# Patient Record
Sex: FEMALE | Race: OTHER | NOT HISPANIC OR LATINO | URBAN - METROPOLITAN AREA
[De-identification: names, ages, dates, MRNs, and addresses within clinical notes are randomized per-mention and may not be internally consistent; named-entity substitution may affect disease eponyms.]

---

## 2022-01-24 ENCOUNTER — EMERGENCY (EMERGENCY)
Facility: HOSPITAL | Age: 27
LOS: 1 days | Discharge: HOME | End: 2022-01-24
Admitting: EMERGENCY MEDICINE
Payer: OTHER MISCELLANEOUS

## 2022-01-24 VITALS
OXYGEN SATURATION: 99 % | RESPIRATION RATE: 17 BRPM | WEIGHT: 119.05 LBS | SYSTOLIC BLOOD PRESSURE: 117 MMHG | HEART RATE: 70 BPM | TEMPERATURE: 98 F | DIASTOLIC BLOOD PRESSURE: 59 MMHG
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
## 2022-01-24 DIAGNOSIS — S61.213A LACERATION WITHOUT FOREIGN BODY OF LEFT MIDDLE FINGER WITHOUT DAMAGE TO NAIL, INITIAL ENCOUNTER: ICD-10-CM
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
## 2022-01-24 PROCEDURE — 12001 RPR S/N/AX/GEN/TRNK 2.5CM/<: CPT

## 2022-01-24 PROCEDURE — 99053 MED SERV 10PM-8AM 24 HR FAC: CPT

## 2022-01-24 PROCEDURE — 99282 EMERGENCY DEPT VISIT SF MDM: CPT

## 2022-01-24 PROCEDURE — 99283 EMERGENCY DEPT VISIT LOW MDM: CPT | Mod: 25

## 2022-01-24 NOTE — ED PROVIDER NOTE - PATIENT PORTAL LINK FT
You can access the FollowMyHealth Patient Portal offered by [facility] by registering at the following website: [email or web address]. By joining [employer]’s FollowMyHealth portal, you will also be able to view your health information using other applications (apps) compatible with our system.

## 2022-01-24 NOTE — ED ADULT NURSE NOTE - NSIMPLEMENTINTERV_GEN_ALL_ED
Implemented All Universal Safety Interventions:  Britt to call system. Call bell, personal items and telephone within reach. Instruct patient to call for assistance. Room bathroom lighting operational. Non-slip footwear when patient is off stretcher. Physically safe environment: no spills, clutter or unnecessary equipment. Stretcher in lowest position, wheels locked, appropriate side rails in place.

## 2022-01-24 NOTE — ED PROVIDER NOTE - OBJECTIVE STATEMENT
25 y/o female with no PMHx who is UTD with Tdap is here in the ED c/o laceration to her left middle finger. Pt states she was at work when she accidently grazed a broken plate about an hour prior to ED arrival. She held pressure and found difficulty with controlling the bleeding therefore here in the ED. She denies the following: numbness/tingling to her finger, difficulty with movement or pain.

## 2022-01-24 NOTE — ED PROVIDER NOTE - CLINICAL SUMMARY MEDICAL DECISION MAKING FREE TEXT BOX
25 y/o female here in the ED c/o laceration to her left middle finger that occurred an hour ago at work. Hemostasis achieved prior to ED arrival. Superficial laceration noted. Good rom, nvi. Wound repaired with Dermabond. Given wound care instructions and advised PMD follow-up for wound check as needed. I have discussed the discharge plan with the patient. The patient agrees with the plan, as discussed.  The patient understands Emergency Department diagnosis is a preliminary diagnosis often based on limited information and that the patient must adhere to the follow-up plan as discussed.  The patient understands that if the symptoms worsen or if prescribed medications do not have the desired/planned effect that the patient may return to the Emergency Department at any time for further evaluation and treatment.

## 2022-01-24 NOTE — ED PROVIDER NOTE - NSFOLLOWUPINSTRUCTIONS_ED_ALL_ED_FT
Please keep wound clean and dry and do not peel off adhesive. Return to the Emergency Department if you have any new or worsening symptoms, or if you have any concerns.    Tissue Adhesive Wound Care      Some cuts, wounds, lacerations, and incisions can be repaired by using tissue adhesive, also called skin glue. It holds the skin together so healing can happen faster. It forms a strong bond on the skin in about 1 minute, and it reaches its full strength in about 2–3 minutes. The adhesive disappears naturally while the wound is healing. It is important to take good care of your wound at home while it heals.      Follow these instructions at home:      Wound care                   •If a bandage (dressing) has been applied, keep it clean and dry.    •Follow instructions from your health care provider about how often to change the dressing.  •Wash your hands with soap and water for at least 20 seconds before and after you change your dressing. If soap and water are not available, use hand .      •Change your dressing as told by your health care provider.      •Leave tissue adhesive in place. It will come off naturally after 7–10 days.        • Do not scratch, rub, or pick at the adhesive.      • Do not place tape over the adhesive. The adhesive could come off the wound when you pull the tape off.      •Protect the wound from further injury until it is healed.    •Check your wound area every day for signs of infection. Check for:  •More redness, swelling, or pain.      •Fluid or blood.      •Warmth.      •Pus or a bad smell.        Bathing   • Do not take baths, swim, or use a hot tub until your health care provider approves. You may only be allowed to take sponge baths. Ask your health care provider if you may take showers.  •You can usually shower after the first 24 hours.      •Cover the dressing with a watertight covering when you take a shower.        • Do not soak the area where there is tissue adhesive.      • Do not use any soaps, petroleum jelly products, or ointments on the wound. Certain ointments can weaken the adhesive.      Eating and drinking   •Eat a diet that includes protein, vitamin A, vitamin C, and other nutrients to help the wound heal. As told by your health care provider, eat a diet that contains food rich in:  •Protein. These include meat, fish, eggs, dairy, beans, and nuts.      •Vitamin A. These include carrots and dark green, leafy vegetables.      •Vitamin C. These include citrus fruits, tomatoes, broccoli, and peppers.        •Drink enough fluid to keep your urine pale yellow.      General instructions     •Protect your wound from the sun when you are outside for the first 6 months, or for as long as told by your health care provider. Apply sunscreen with an SPF of 30 or higher around the scar, or cover it up.      •Take over-the-counter and prescription medicines only as told by your health care provider.      • Do not use any products that contain nicotine or tobacco, such as cigarettes, e-cigarettes, and chewing tobacco. These can delay wound healing. If you need help quitting, ask your health care provider.      •Keep all follow-up visits as told by your health care provider. This is important.        Contact a health care provider if:    •The tissue adhesive becomes soaked with blood or falls off before your wound has healed. The adhesive may need to be replaced.      •You have a fever or chills.        Get help right away if:    •Your wound reopens.    •You have any of these signs of infection:  •More redness, swelling, or pain around the wound.      •A red streak at the area around the wound.      •Fluid or blood coming from the wound.      •Warmth coming from the wound.      •Pus or a bad smell coming from the wound.        •You develop a rash after the adhesive is applied.        Summary    •The adhesive disappears naturally while the wound is healing. It is important to take good care of your wound at home while it heals.      •Always wash your hands for at least 20 seconds with soap and water before and after changing your bandage (dressing).      •To help with healing, eat foods that are rich in protein, vitamin A, vitamin C, and other nutrients.      •Check your wound area every day for signs of infection. Get help right away if you suspect that your wound is infected.      This information is not intended to replace advice given to you by your health care provider. Make sure you discuss any questions you have with your health care provider.      Document Revised: 02/25/2021 Document Reviewed: 11/13/2020    [employer] Patient Education © 2021 [employer]

## 2022-01-24 NOTE — ED ADULT NURSE NOTE - OBJECTIVE STATEMENT
Pt reports a plate broke causing laceration to L middle finger. Pt has laceration to palmar, upper L middle finger. Pt reports tdap is BARRIE, last shot in 2018. Pt denies any numbness/tingling, able to move finger. Pt bleeding controlled with gauze dressing. Pt AOx4, ambulatory with steady gait.